# Patient Record
Sex: FEMALE | Race: WHITE | NOT HISPANIC OR LATINO | URBAN - METROPOLITAN AREA
[De-identification: names, ages, dates, MRNs, and addresses within clinical notes are randomized per-mention and may not be internally consistent; named-entity substitution may affect disease eponyms.]

---

## 2017-01-04 ENCOUNTER — IMPORTED ENCOUNTER (OUTPATIENT)
Dept: URBAN - METROPOLITAN AREA CLINIC 31 | Facility: CLINIC | Age: 77
End: 2017-01-04

## 2017-01-04 PROBLEM — H50.22: Noted: 2017-01-04

## 2017-01-04 PROBLEM — H50.10: Noted: 2017-01-04

## 2017-01-04 PROBLEM — H11.422: Noted: 2017-01-04

## 2017-01-04 PROBLEM — H04.212: Noted: 2017-01-04

## 2017-01-04 PROBLEM — Z96.1: Noted: 2017-01-04

## 2017-01-04 PROBLEM — H18.59: Noted: 2017-01-04

## 2017-01-04 PROCEDURE — 92060 SENSORIMOTOR EXAMINATION: CPT

## 2017-01-04 PROCEDURE — 92004 COMPRE OPH EXAM NEW PT 1/>: CPT

## 2017-01-04 NOTE — PATIENT DISCUSSION
1.  Conjunctival chemosis with secondary irritation:  No evidence of ocular cicatricial pemphigoid. Edema may be chronic from postsurgical trauma from lid and muscle surgery affecting outflow vs inflammatory. Start Pred 4x/d. If better would taper slowly may need drops chronic to prevent recurrence. Need to review old records. 2. Epiphora OS - s/p DCR conjunctival chemosis most likely blocking drainage. s/p multiple lid surgeries with good lid closure now. 3. Reviewed ABMD mild and not affecting vision or symptoms monitor. 4. Hypertropia Exotropia OS: s/p muscle surgery with small residual stable with prism in glasses. 5. Pseudophakia OU - IOLs stable. Monitor. 6. Return for an appointment in 3 weeks for office call. with Dr. Darrin Green. 7.  Conjunctival Edema OS - 8.   Exotropia OS -

## 2017-01-27 ENCOUNTER — IMPORTED ENCOUNTER (OUTPATIENT)
Dept: URBAN - METROPOLITAN AREA CLINIC 31 | Facility: CLINIC | Age: 77
End: 2017-01-27

## 2017-01-27 PROBLEM — Z96.1: Noted: 2017-01-27

## 2017-01-27 PROBLEM — H50.22: Noted: 2017-01-27

## 2017-01-27 PROBLEM — H11.422: Noted: 2017-01-27

## 2017-01-27 PROBLEM — H18.59: Noted: 2017-01-27

## 2017-01-27 PROBLEM — H04.212: Noted: 2017-01-27

## 2017-01-27 PROBLEM — H50.10: Noted: 2017-01-27

## 2017-01-27 PROCEDURE — 99213 OFFICE O/P EST LOW 20 MIN: CPT

## 2017-01-27 NOTE — PATIENT DISCUSSION
1.  Conjunctival chemosis /chalasis resolving  Taper PRED to qd weekly if worsens go back to prior dose. Will need low dose chronic to prevent recurrence. Watch IOP2. Epiphora OS - s/p DCR resolveds/p multiple lid surgeries with good lid closure now. 3. Reviewed ABMD mild and not affecting vision or symptoms monitor. 4. Hypertropia Exotropia OS: s/p muscle surgery with small residual stable with prism in glasses. secondary to TED5. Pseudophakia OU - IOLs stable. Monitor. 6. Return for an appointment in 6 weeks for office call. with Dr. Barbar Castleman.

## 2017-03-15 ENCOUNTER — IMPORTED ENCOUNTER (OUTPATIENT)
Dept: URBAN - METROPOLITAN AREA CLINIC 31 | Facility: CLINIC | Age: 77
End: 2017-03-15

## 2017-03-15 PROBLEM — Z96.1: Noted: 2017-03-15

## 2017-03-15 PROBLEM — H11.422: Noted: 2017-03-15

## 2017-03-15 PROBLEM — H53.2: Noted: 2017-03-15

## 2017-03-15 PROCEDURE — 99213 OFFICE O/P EST LOW 20 MIN: CPT

## 2017-03-15 NOTE — PATIENT DISCUSSION
1.  Pseudophakia OU - IOLs stable. Monitor. 2. Diplopia - From CLEMENCIA stable in glasses3. Stable on qd PRED CPM in 6 weeks try qod but do not stop4. Return for an appointment in 4 months for office call. with Dr. Zoie Felix.  5.  Conjunctival Edema OS -

## 2017-07-14 ENCOUNTER — IMPORTED ENCOUNTER (OUTPATIENT)
Dept: URBAN - METROPOLITAN AREA CLINIC 31 | Facility: CLINIC | Age: 77
End: 2017-07-14

## 2017-07-14 PROBLEM — H11.423: Noted: 2017-07-14

## 2017-07-14 PROBLEM — H04.212: Noted: 2017-07-14

## 2017-07-14 PROBLEM — Z96.1: Noted: 2017-07-14

## 2017-07-14 PROBLEM — H11.823: Noted: 2017-07-14

## 2017-07-14 PROBLEM — H18.59: Noted: 2017-07-14

## 2017-07-14 PROBLEM — H11.422: Noted: 2017-07-14

## 2017-07-14 PROBLEM — H50.10: Noted: 2017-07-14

## 2017-07-14 PROBLEM — H50.22: Noted: 2017-07-14

## 2017-07-14 PROCEDURE — 99213 OFFICE O/P EST LOW 20 MIN: CPT

## 2017-07-14 NOTE — PATIENT DISCUSSION
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 Environmental changes to minimize dryness and exposure and the use of artificial tears and topical steroids were recommended.

## 2017-07-14 NOTE — PATIENT DISCUSSION
1.  Conjunctival chemosis Cecile Shelby. Worse Restart PRED 3x/d if not better in a week change to Durezol 3x/d. Do not taper until seen2. Epiphora OS - s/p DCR resolveds/p multiple lid surgeries with good lid closure now. 3. Reviewed ABMD mild and not affecting vision or symptoms monitor. 4. Hypertropia Exotropia OS: s/p muscle surgery with small residual stable with prism in glasses. secondary to TED5. Pseudophakia OU - IOLs stable. MonitorReturn for an appointment in 3 weeks for office call. with Dr. Denver Curb.

## 2017-08-11 ENCOUNTER — IMPORTED ENCOUNTER (OUTPATIENT)
Dept: URBAN - METROPOLITAN AREA CLINIC 31 | Facility: CLINIC | Age: 77
End: 2017-08-11

## 2017-08-11 PROBLEM — Z96.1: Noted: 2017-08-11

## 2017-08-11 PROBLEM — H50.22: Noted: 2017-08-11

## 2017-08-11 PROBLEM — H18.59: Noted: 2017-08-11

## 2017-08-11 PROBLEM — H04.212: Noted: 2017-08-11

## 2017-08-11 PROBLEM — H11.422: Noted: 2017-08-11

## 2017-08-11 PROBLEM — H50.10: Noted: 2017-08-11

## 2017-08-11 PROCEDURE — 99213 OFFICE O/P EST LOW 20 MIN: CPT

## 2017-08-11 NOTE — PATIENT DISCUSSION
1.  Conjunctival chemosis Rohini Fitting. Improving PRED 3x/d for a week then 2x/d. USE tears 4x/d. Do not taper until seen2. Epiphora OS - s/p DCRs/p multiple lid surgeries with good lid closure now. 3. Reviewed ABMD mild and not affecting vision or symptoms monitor. 4. Hypertropia Exotropia OS: s/p muscle surgery with small residual stable with prism in glasses. secondary to TED5. Pseudophakia OU - IOLs stable. MonitorReturn for an appointment in 1 month for office call. with Dr. Raven Orona.

## 2017-09-15 ENCOUNTER — IMPORTED ENCOUNTER (OUTPATIENT)
Dept: URBAN - METROPOLITAN AREA CLINIC 31 | Facility: CLINIC | Age: 77
End: 2017-09-15

## 2017-09-15 PROBLEM — H18.59: Noted: 2017-09-15

## 2017-09-15 PROBLEM — H04.212: Noted: 2017-09-15

## 2017-09-15 PROBLEM — Z96.1: Noted: 2017-09-15

## 2017-09-15 PROBLEM — H50.10: Noted: 2017-09-15

## 2017-09-15 PROBLEM — H11.422: Noted: 2017-09-15

## 2017-09-15 PROBLEM — H50.22: Noted: 2017-09-15

## 2017-09-15 PROCEDURE — 99213 OFFICE O/P EST LOW 20 MIN: CPT

## 2018-10-22 ENCOUNTER — IMPORTED ENCOUNTER (OUTPATIENT)
Dept: URBAN - METROPOLITAN AREA CLINIC 31 | Facility: CLINIC | Age: 78
End: 2018-10-22

## 2018-10-22 PROBLEM — H53.2: Noted: 2018-10-22

## 2018-10-22 PROBLEM — Z96.1: Noted: 2018-10-22

## 2018-10-22 PROBLEM — H40.043: Noted: 2018-10-22

## 2018-10-22 PROBLEM — H16.223: Noted: 2018-10-22

## 2018-10-22 PROBLEM — H11.232: Noted: 2018-10-22

## 2018-10-22 PROBLEM — H04.123: Noted: 2018-10-22

## 2018-10-22 PROCEDURE — 99213 OFFICE O/P EST LOW 20 MIN: CPT

## 2018-10-22 NOTE — PATIENT DISCUSSION
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 Discussed treatment options frequent PF AT q1-2 hours Lotemax 2x/d for 2 weeks then taper may need chronic but watch IOP increased on PRED. Restasis or Xiidra to stimulate increased tear production. Drops may take 3-6 months for full effect.  Start Restasis BID after 2 weeks

## 2018-10-22 NOTE — PATIENT DISCUSSION
1.  Scarring of conjunctiva h/o muscle surgery no evidence of pemphigoid. 2. Diplopia - straight after muscle surgery and prism in glasses. h/o CLEMENCIA. 3.  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 Discussed treatment options frequent PF AT q1-2 hours Lotemax 2x/d for 2 weeks then taper may need chronic but watch IOP increased on PRED. Restasis or Xiidra to stimulate increased tear production. Drops may take 3-6 months for full effect. Start Restasis BID after 2 weeks4. Steroid Induced Glaucoma OU:  Elevated IOP in past on PRED. watch IOP carefully5. Pseudophakia OU - IOLs stable. Monitor. 6. Exposure OS exacerbated by severe dry eye s/p lid surgery continue arvind qhs.7. Return for an appointment in 1 month for office call. with Dr. Francesca Monroe.

## 2022-04-02 ASSESSMENT — VISUAL ACUITY
OS_SC: 20/50
OS_SC: 20/40-1
OD_SC: 20/25-1
OS_SC: 20/40
OS_SC: 20/30-2
OS_PH: CC 20/40 -1
OD_SC: 20/25
OS_SC: 20/25-2
OD_SC: 20/25
OD_SC: 20/20
OS_PH: CC 20/30
OS_SC: 20/60+2
OS_PH: CC 20/30 -2
OD_SC: 20/30+1
OD_SC: 20/25-2

## 2022-04-02 ASSESSMENT — TONOMETRY
OD_IOP_MMHG: 9
OS_IOP_MMHG: 27
OS_IOP_MMHG: 19
OS_IOP_MMHG: 11
OD_IOP_MMHG: 12
OS_IOP_MMHG: 11
OD_IOP_MMHG: 9